# Patient Record
Sex: MALE | Race: WHITE | NOT HISPANIC OR LATINO | URBAN - NONMETROPOLITAN AREA
[De-identification: names, ages, dates, MRNs, and addresses within clinical notes are randomized per-mention and may not be internally consistent; named-entity substitution may affect disease eponyms.]

---

## 2024-05-22 ENCOUNTER — OFFICE (OUTPATIENT)
Dept: URBAN - NONMETROPOLITAN AREA CLINIC 1 | Facility: CLINIC | Age: 59
End: 2024-05-22
Payer: COMMERCIAL

## 2024-05-22 VITALS
HEIGHT: 72 IN | HEART RATE: 64 BPM | DIASTOLIC BLOOD PRESSURE: 70 MMHG | SYSTOLIC BLOOD PRESSURE: 135 MMHG | WEIGHT: 147 LBS

## 2024-05-22 DIAGNOSIS — R19.5 OTHER FECAL ABNORMALITIES: ICD-10-CM

## 2024-05-22 DIAGNOSIS — Z87.11 PERSONAL HISTORY OF PEPTIC ULCER DISEASE: ICD-10-CM

## 2024-05-22 DIAGNOSIS — D64.9 ANEMIA, UNSPECIFIED: ICD-10-CM

## 2024-05-22 PROCEDURE — 99204 OFFICE O/P NEW MOD 45 MIN: CPT | Performed by: NURSE PRACTITIONER

## 2024-05-22 PROCEDURE — 36415 COLL VENOUS BLD VENIPUNCTURE: CPT | Performed by: NURSE PRACTITIONER

## 2024-05-22 NOTE — SERVICENOTES
Risks of procedures explained to patient and he wishes to proceed. 
Bowel prep prescribed and instructions given to patient
EGD for melena, anemia as above and positive fecal occult blood-he has history of antrum stomach ulcers found via EGD 5/2023 MASON-.
If no obvious UGIB source identified, will obtain pill capsule and also can consider repeat colonoscopy for completeness
Procedure in hospital setting 
Avoid all NSAIDs
Continue current ppi as prescribed.

## 2024-05-22 NOTE — SERVICEHPINOTES
Patient with a history of UGIB, ESRD, HTN, colon polyps presents to the clinic today for anemia.  Patient was referred by Dr. Arango nephrologist for trending decrease in H&ampH over the last few weeks accompanied by positive fecal occult blood test on 4/2024.  He has a significant history of UGIB secondary to antrum stomach ulcers via EGD 5/2023 requiring hospitalization and blood transfusions.  He had a reoccurrence of UGIB soon thereafter in which patient was also hospitalized with repeat EGD and colonoscopy by Dr. Antonio that did not reveal obvious bleeding source.  
br
brHe denies any melena or hematochezia.  He admits taking ASA 325mg for a few days while suffering a URI a month ago.  He denies n/v/d, abdominal pain, hematemesis, unintentional weight loss, fever, or change in BM.  He has a good appetite, and has 1 formed BM daily, denies straining with defecation.  He dips tobacco daily.  Denies cardiac stents, anticoagulation therapy, supplemental oxygenation use.  ESRD dialysis MWF-Dr. Arango br
brHgb 10.8 2/24, 9.2 3/24, 8.4 4/24, Positive fecal occult blood 4/24  
br
br   EGD by Dr. Antonio 5/21/23
brFindings:brEsophagus: 	Normal appearance of the esophagus. No evidence of significant reflux induced changes, ulcer, mass, stricture, or varices.brGastroesophageal junction: Moderate size hiatal hernia.brStomach: Antral erythematous tissue but the previously noted ulcers appeared to have healed over at this time. No evidence of any high risk bleeding stigmata. No evidence of blood, varices, or AVM.brPylorus: Slightly edematous but otherwise patent without obvious ulceration.brDuodenum: Some mild erythematous tissue within the bulb and duodenal sweep but no evidence of ulceration AVM or blood. 
br
brColonoscopy by Dr. Antonio 5/22/23
brFindings: Three 3 to 7 millimeter broad-based ascending colon polyps removed with cold snare technique and retrieved. A single 3 millimeter broad-based transverse colon polyp removed with cold snare technique and retrieved. Mild sigmoid colon diverticulosis. Colon and terminal ileum otherwise appeared normal. Small internal hemorrhoids.
brFinal Pathologic Diagnosis:br   1.   ENDOSCOPIC BIOPSY, "ASCENDING COLON POLYPS:"br     - Tubular adenoma(s).br   2.   ENDOSCOPIC BIOPSY, "TRANSVERSE COLON POLYP:"br     - Tubular adenoma. 
br
br Hospital Course per Dr. Wall 5/21/2023 is a 57- year- old male with a past medical history of HTN , ESRD on dialysis, and NSAID induced ulcers, who presented to the emergency room with complaints of generalized weakness, shortness of breath on exertion and on going black stools. Last episode of melena was yesterday. Initial lab workup in the ER revealed hemoglobin of 4.7. He has currently received 3 units of PRBCs. Post transfusion labs are currently pending. He denies any abdominal pain, acid reflux, heartburn symptoms, nausea, vomiting, hematemesis, and hematochezia. He denies any taking any NSAIDS since being discharged from the hospital. Ora was recently hospitalized this month for acute4 anemia and underwent a EGD by Dr. Romeo on 5/7/2023 which revealed  There were numerous irregular shaped 5 to 10 millimeter superficial ulcers in the antrum from NSAIDs. Biopsies were taken for MASON test. He denies any taking any over-the-counter NSAIDS since being discharged from the hospital on 5/7/2023.GI HISTORY: 5/7/2023 EGD by Dr. Romeo Findings: Esophagus: Esophagus was normal. GE junction and Z-line at 40 centimeters. No evidence of Gayle's esophagus. Stomach: There were numerous irregular shaped 5 to 10 millimeter superficial ulcers in the antrum from NSAIDs. Biopsies were taken for MASON test. No active bleeding. Otherwise the stomach was normal. Pylorus: Widely patent. Duodenum: Normal to the 4th portion the duodenum.

## 2024-05-29 ENCOUNTER — ON CAMPUS - OUTPATIENT (OUTPATIENT)
Dept: URBAN - NONMETROPOLITAN AREA HOSPITAL 34 | Facility: HOSPITAL | Age: 59
End: 2024-05-29

## 2024-05-29 DIAGNOSIS — K92.2 GASTROINTESTINAL HEMORRHAGE, UNSPECIFIED: ICD-10-CM

## 2024-05-29 DIAGNOSIS — D50.9 IRON DEFICIENCY ANEMIA, UNSPECIFIED: ICD-10-CM

## 2024-05-29 PROCEDURE — 43235 EGD DIAGNOSTIC BRUSH WASH: CPT | Performed by: INTERNAL MEDICINE

## 2024-05-30 ENCOUNTER — OFFICE (OUTPATIENT)
Dept: URBAN - NONMETROPOLITAN AREA CLINIC 1 | Facility: CLINIC | Age: 59
End: 2024-05-30

## 2024-05-30 DIAGNOSIS — D50.9 IRON DEFICIENCY ANEMIA, UNSPECIFIED: ICD-10-CM

## 2024-05-30 DIAGNOSIS — K92.2 GASTROINTESTINAL HEMORRHAGE, UNSPECIFIED: ICD-10-CM

## 2024-05-30 PROCEDURE — 91110 GI TRC IMG INTRAL ESOPH-ILE: CPT | Mod: 26 | Performed by: INTERNAL MEDICINE
